# Patient Record
Sex: FEMALE | Race: WHITE | URBAN - METROPOLITAN AREA
[De-identification: names, ages, dates, MRNs, and addresses within clinical notes are randomized per-mention and may not be internally consistent; named-entity substitution may affect disease eponyms.]

---

## 2017-10-16 ENCOUNTER — APPOINTMENT (RX ONLY)
Dept: URBAN - METROPOLITAN AREA CLINIC 39 | Facility: CLINIC | Age: 47
Setting detail: DERMATOLOGY
End: 2017-10-16

## 2017-10-16 DIAGNOSIS — L92.3 FOREIGN BODY GRANULOMA OF THE SKIN AND SUBCUTANEOUS TISSUE: ICD-10-CM

## 2017-10-16 DIAGNOSIS — Z79.899 OTHER LONG TERM (CURRENT) DRUG THERAPY: ICD-10-CM

## 2017-10-16 PROBLEM — E03.9 HYPOTHYROIDISM, UNSPECIFIED: Status: ACTIVE | Noted: 2017-10-16

## 2017-10-16 PROBLEM — F41.9 ANXIETY DISORDER, UNSPECIFIED: Status: ACTIVE | Noted: 2017-10-16

## 2017-10-16 PROCEDURE — ? PRESCRIPTION

## 2017-10-16 PROCEDURE — ? TREATMENT REGIMEN

## 2017-10-16 PROCEDURE — 99202 OFFICE O/P NEW SF 15 MIN: CPT

## 2017-10-16 PROCEDURE — ? COUNSELING

## 2017-10-16 RX ORDER — PREDNISONE 10 MG/1
TABLET ORAL QD
Qty: 32 | Refills: 0 | Status: ERX | COMMUNITY
Start: 2017-10-16

## 2017-10-16 RX ORDER — CEFDINIR 300 MG/1
CAPSULE ORAL BID
Qty: 20 | Refills: 0 | Status: ERX | COMMUNITY
Start: 2017-10-16

## 2017-10-16 RX ORDER — FLUCONAZOLE 150 MG/1
TABLET ORAL QWEEK
Qty: 1 | Refills: 3 | Status: ERX | COMMUNITY
Start: 2017-10-16

## 2017-10-16 RX ADMIN — CEFDINIR -: 300 CAPSULE ORAL at 00:00

## 2017-10-16 RX ADMIN — FLUCONAZOLE: 150 TABLET ORAL at 00:00

## 2017-10-16 RX ADMIN — PREDNISONE -: 10 TABLET ORAL at 00:00

## 2017-10-16 ASSESSMENT — LOCATION DETAILED DESCRIPTION DERM
LOCATION DETAILED: RIGHT INFERIOR VERMILION LIP
LOCATION DETAILED: RIGHT SUPERIOR VERMILION LIP
LOCATION DETAILED: LEFT SUPERIOR VERMILION LIP
LOCATION DETAILED: LEFT INFERIOR VERMILION LIP

## 2017-10-16 ASSESSMENT — LOCATION SIMPLE DESCRIPTION DERM
LOCATION SIMPLE: RIGHT LIP
LOCATION SIMPLE: LEFT LIP

## 2017-10-16 ASSESSMENT — LOCATION ZONE DERM: LOCATION ZONE: LIP

## 2017-10-16 NOTE — PROCEDURE: TREATMENT REGIMEN
Detail Level: Simple
Plan: OBSERVATION PHOTO'S TAKEN \\nPatient taken to [facility] to set up an appointment with Sully CRUZ so pre-steroid eval can be compared to post. Discussed hyaluronidase. Deferring in an effort to regain desired outcome as she enjoyed prior to 2 weeks ago. I ([facility]) was made aware or this case last week and spoke to the [employer] , Candy Pettit. Several articles were emailed that I have reviewed discussing reactions to fillers in general including HA's but not specific for Vollure. This is considered late (14d - 1yr onset). Granulomatous vs Biofilm (bacterial and less likely) discussed. Biopsy discussed but not strongly recommended pending treatment trial. Revisit if recitrant. \\n
Initiate Treatment: prednisone 10 mg tablet PO Frequency: qd Sig: Take 4 PO QD x 3d, 3/d x 3d, 2/d x 3d, 1/d x 3d, 1/2 /d x 4d (patient given instructions written by Dr. Alexandre on how to take dosage)\\nDifluchan 1 tab q week\\nCefdinir 300 mg one capsule bid
01-Sep-2017 17:57

## 2017-10-16 NOTE — HPI: SKIN IRRITATION
Additional History: Previous injection of Juvéderm Vollure in [place] office by Melanie. Patient called one week ago complaining of firm bumpy, swollen lips. She spoke to Amy in [facility] 10/11/2017. Amy recommended patient to follow up with Dr. Alexandre.

## 2017-10-30 ENCOUNTER — APPOINTMENT (RX ONLY)
Dept: URBAN - METROPOLITAN AREA CLINIC 39 | Facility: CLINIC | Age: 47
Setting detail: DERMATOLOGY
End: 2017-10-30

## 2017-10-30 DIAGNOSIS — L92.3 FOREIGN BODY GRANULOMA OF THE SKIN AND SUBCUTANEOUS TISSUE: ICD-10-CM

## 2017-10-30 DIAGNOSIS — Z79.899 OTHER LONG TERM (CURRENT) DRUG THERAPY: ICD-10-CM

## 2017-10-30 PROCEDURE — ? PRESCRIPTION

## 2017-10-30 PROCEDURE — ? TREATMENT REGIMEN

## 2017-10-30 PROCEDURE — 99213 OFFICE O/P EST LOW 20 MIN: CPT

## 2017-10-30 PROCEDURE — ? COUNSELING

## 2017-10-30 RX ORDER — PREDNISONE 10 MG/1
TABLET ORAL QD
Qty: 30 | Refills: 0 | Status: ERX

## 2017-10-30 ASSESSMENT — LOCATION DETAILED DESCRIPTION DERM
LOCATION DETAILED: LEFT INFERIOR VERMILION LIP
LOCATION DETAILED: LEFT SUPERIOR VERMILION LIP
LOCATION DETAILED: RIGHT INFERIOR VERMILION LIP
LOCATION DETAILED: RIGHT SUPERIOR VERMILION LIP

## 2017-10-30 ASSESSMENT — LOCATION SIMPLE DESCRIPTION DERM
LOCATION SIMPLE: RIGHT LIP
LOCATION SIMPLE: LEFT LIP

## 2017-10-30 ASSESSMENT — LOCATION ZONE DERM: LOCATION ZONE: LIP

## 2017-10-30 NOTE — PROCEDURE: TREATMENT REGIMEN
Detail Level: Simple
Plan: The appearance is improved but induration is still significant. Increase prednisone back to:  2,2,2,2,2,1,1,1,1,1,1,1/2,1,1/2,1,1/2,1,0,1,0,1,0...until modified otherwise. I would prefer that she undergo an evaluation from [place] (scheduled for next week) prior to deciding which/order of next options:\\n\\n1. Biopsy\\n2. hyaluronidase\\n3. Intralesional kenalog\\n

## 2017-11-02 ENCOUNTER — APPOINTMENT (RX ONLY)
Dept: URBAN - METROPOLITAN AREA CLINIC 58 | Facility: CLINIC | Age: 47
Setting detail: DERMATOLOGY
End: 2017-11-02

## 2017-11-02 DIAGNOSIS — Z41.9 ENCOUNTER FOR PROCEDURE FOR PURPOSES OTHER THAN REMEDYING HEALTH STATE, UNSPECIFIED: ICD-10-CM

## 2017-11-02 PROCEDURE — ? ADDITIONAL NOTES

## 2017-11-02 PROCEDURE — ? HYALURONIDASE INJECTION

## 2017-11-02 ASSESSMENT — LOCATION ZONE DERM: LOCATION ZONE: LIP

## 2017-11-02 ASSESSMENT — LOCATION SIMPLE DESCRIPTION DERM
LOCATION SIMPLE: RIGHT LIP
LOCATION SIMPLE: LEFT LIP

## 2017-11-02 ASSESSMENT — LOCATION DETAILED DESCRIPTION DERM
LOCATION DETAILED: LEFT INFERIOR VERMILION LIP
LOCATION DETAILED: RIGHT SUPERIOR VERMILION LIP
LOCATION DETAILED: LEFT SUPERIOR VERMILION LIP
LOCATION DETAILED: RIGHT INFERIOR VERMILION LIP

## 2017-11-02 NOTE — PROCEDURE: HYALURONIDASE INJECTION
Lot # (Optional): [id number]
Filler Previously Used (Optional): vollure
Total Volume (Ccs): 0.2
Expiration Date (Optional): 03/2019
Consent: The risks of contour defects and dimpling of the skin were reviewed with the patient prior to the injection.
Detail Level: Detailed
Hyaluronidase Preparation: hyaluronidase

## 2017-11-02 NOTE — PROCEDURE: ADDITIONAL NOTES
Additional Notes: Pt presented 3 months follow up post Vollure injection by Laura Hernandez on 7/19/17. 25 \\nDr Hernan saw patient in the OP location gave RX for Prednisone and Abx. Pt today with hard nodules in upper and lower vermillion body. Suggested Hylenex to dissolve.  Pt agreed.  Will follow up with pt in 10 days to assess.  Gave pt # to call for emergency or questions.  25units to right Verm body upper /25 units left Verm body upper\\n15 units to lower right Verm body / 15 units left lower Verm body \\nCold packs given. RTC PRN \\nG. Stephanie GÓMEZ, CANS

## 2017-11-14 ENCOUNTER — APPOINTMENT (RX ONLY)
Dept: URBAN - METROPOLITAN AREA CLINIC 58 | Facility: CLINIC | Age: 47
Setting detail: DERMATOLOGY
End: 2017-11-14

## 2017-11-14 DIAGNOSIS — Z41.9 ENCOUNTER FOR PROCEDURE FOR PURPOSES OTHER THAN REMEDYING HEALTH STATE, UNSPECIFIED: ICD-10-CM

## 2017-11-14 PROCEDURE — ? HYALURONIDASE INJECTION

## 2017-11-14 PROCEDURE — ? ADDITIONAL NOTES

## 2017-11-14 ASSESSMENT — LOCATION ZONE DERM: LOCATION ZONE: LIP

## 2017-11-14 ASSESSMENT — LOCATION SIMPLE DESCRIPTION DERM
LOCATION SIMPLE: LEFT LIP
LOCATION SIMPLE: RIGHT LIP

## 2017-11-14 NOTE — PROCEDURE: HYALURONIDASE INJECTION
Detail Level: Detailed
Expiration Date (Optional): 12/2019
Consent: The risks of contour defects and dimpling of the skin were reviewed with the patient prior to the injection.
Total Volume (Ccs): 0.2
Lot # (Optional): [id number]
Filler Previously Used (Optional): vollure
Hyaluronidase Preparation: hyaluronidase

## 2017-11-14 NOTE — PROCEDURE: ADDITIONAL NOTES
Additional Notes: Skin tested right forearm explained POC and continue treatments to dissolve.  Encourage pt to call PRN \\n50 units upper lip/ 30 units lower lip\\n25 upper right lip/ 15 lower right lip\\n25 upper left lip/ 15 lower right lip

## 2017-11-30 ENCOUNTER — APPOINTMENT (RX ONLY)
Dept: URBAN - METROPOLITAN AREA CLINIC 58 | Facility: CLINIC | Age: 47
Setting detail: DERMATOLOGY
End: 2017-11-30

## 2017-11-30 DIAGNOSIS — Z41.9 ENCOUNTER FOR PROCEDURE FOR PURPOSES OTHER THAN REMEDYING HEALTH STATE, UNSPECIFIED: ICD-10-CM

## 2017-11-30 PROCEDURE — ? ADDITIONAL NOTES

## 2017-11-30 PROCEDURE — ? HYALURONIDASE INJECTION

## 2017-11-30 ASSESSMENT — LOCATION SIMPLE DESCRIPTION DERM
LOCATION SIMPLE: LEFT LIP
LOCATION SIMPLE: RIGHT LIP

## 2017-11-30 ASSESSMENT — LOCATION ZONE DERM: LOCATION ZONE: LIP

## 2017-11-30 ASSESSMENT — LOCATION DETAILED DESCRIPTION DERM
LOCATION DETAILED: LEFT INFERIOR VERMILION LIP
LOCATION DETAILED: LEFT SUPERIOR VERMILION LIP
LOCATION DETAILED: RIGHT SUPERIOR VERMILION LIP
LOCATION DETAILED: RIGHT INFERIOR VERMILION LIP

## 2017-11-30 NOTE — PROCEDURE: HYALURONIDASE INJECTION
Detail Level: Detailed
Filler Previously Used (Optional): vollure
Lot # (Optional): [id number]
Consent: The risks of contour defects and dimpling of the skin were reviewed with the patient prior to the injection.
Total Volume (Ccs): 0.2
Expiration Date (Optional): 12/2018
Hyaluronidase Preparation: hyaluronidase

## 2017-12-14 ENCOUNTER — APPOINTMENT (RX ONLY)
Dept: URBAN - METROPOLITAN AREA CLINIC 58 | Facility: CLINIC | Age: 47
Setting detail: DERMATOLOGY
End: 2017-12-14

## 2017-12-14 DIAGNOSIS — Z41.9 ENCOUNTER FOR PROCEDURE FOR PURPOSES OTHER THAN REMEDYING HEALTH STATE, UNSPECIFIED: ICD-10-CM

## 2017-12-14 PROCEDURE — ? ADDITIONAL NOTES

## 2017-12-14 PROCEDURE — ? HYALURONIDASE INJECTION

## 2017-12-14 ASSESSMENT — LOCATION SIMPLE DESCRIPTION DERM
LOCATION SIMPLE: RIGHT LIP
LOCATION SIMPLE: RIGHT UPPER LIP
LOCATION SIMPLE: LEFT LIP

## 2017-12-14 ASSESSMENT — LOCATION DETAILED DESCRIPTION DERM
LOCATION DETAILED: LEFT INFERIOR VERMILION LIP
LOCATION DETAILED: RIGHT SUPERIOR VERMILION BORDER
LOCATION DETAILED: RIGHT SUPERIOR VERMILION LIP
LOCATION DETAILED: RIGHT INFERIOR VERMILION LIP
LOCATION DETAILED: LEFT SUPERIOR VERMILION LIP

## 2017-12-14 ASSESSMENT — LOCATION ZONE DERM: LOCATION ZONE: LIP

## 2017-12-14 NOTE — PROCEDURE: ADDITIONAL NOTES
Additional Notes: 35 units upper right  lip 35 units uper and medial lip\\n30 units lower right  15 units  lower left \\nTotal of 115 units given

## 2018-01-16 ENCOUNTER — APPOINTMENT (RX ONLY)
Dept: URBAN - METROPOLITAN AREA CLINIC 58 | Facility: CLINIC | Age: 48
Setting detail: DERMATOLOGY
End: 2018-01-16

## 2018-01-16 DIAGNOSIS — Z41.9 ENCOUNTER FOR PROCEDURE FOR PURPOSES OTHER THAN REMEDYING HEALTH STATE, UNSPECIFIED: ICD-10-CM

## 2018-01-16 PROCEDURE — ? HYALURONIDASE INJECTION

## 2018-01-16 PROCEDURE — ? ADDITIONAL NOTES

## 2018-01-16 ASSESSMENT — LOCATION DETAILED DESCRIPTION DERM: LOCATION DETAILED: RIGHT SUPERIOR VERMILION LIP

## 2018-01-16 ASSESSMENT — LOCATION ZONE DERM: LOCATION ZONE: LIP

## 2018-01-16 ASSESSMENT — LOCATION SIMPLE DESCRIPTION DERM: LOCATION SIMPLE: RIGHT LIP

## 2018-01-16 NOTE — PROCEDURE: ADDITIONAL NOTES
Additional Notes: Skin test left forearm : no reaction\\nRight upper medial  submucosa 6units\\nWill inject juvederm ultra plus xc

## 2018-01-16 NOTE — PROCEDURE: HYALURONIDASE INJECTION
Detail Level: Detailed
Total Volume (Ccs): 0.2
Consent: The risks of contour defects and dimpling of the skin were reviewed with the patient prior to the injection.
Expiration Date (Optional): 12/2019
Lot # (Optional): [id number]
Hyaluronidase Preparation: hyaluronidase
Filler Previously Used (Optional): vollure

## 2018-02-08 ENCOUNTER — APPOINTMENT (RX ONLY)
Dept: URBAN - METROPOLITAN AREA CLINIC 58 | Facility: CLINIC | Age: 48
Setting detail: DERMATOLOGY
End: 2018-02-08

## 2018-02-08 DIAGNOSIS — Z41.9 ENCOUNTER FOR PROCEDURE FOR PURPOSES OTHER THAN REMEDYING HEALTH STATE, UNSPECIFIED: ICD-10-CM

## 2018-02-08 PROCEDURE — ? JUVEDERM ULTRA PLUS XC INJECTION

## 2018-02-08 PROCEDURE — ? ADDITIONAL NOTES

## 2018-02-08 NOTE — PROCEDURE: ADDITIONAL NOTES
Additional Notes: Btmc 30g1”\\nUpper verm .4\\nLower verm.4\\nPerioral lines.2\\nSmall nodule on the right lower verm body

## 2018-02-08 NOTE — PROCEDURE: JUVEDERM ULTRA PLUS XC INJECTION
Include Cannula Information In Note?: Yes
Dorsal Hands Filler  Volume In Cc: 0
Post-Care Instructions: Patient instructed to apply ice to reduce swelling.
Additional Anesthesia Volume In Cc: 0.1
Consent: Written consent obtained. Risks include but not limited to bruising, beading, irregular texture, ulceration, infection, allergic reaction, scar formation, incomplete augmentation, temporary nature, procedural pain.
Topical Anesthesia?: BLT cream (benzocaine 20%, lidocaine 6%, tetracaine 4%)
Lot #: [id number]
Additional Area 3 Location: glabella
Additional Anesthesia Type: 0.1% lidocaine, 0.03% Marcaine, 1:1,200,000 epinephrine, 51 mcg clindamycin/ml
Anesthesia Volume In Cc: 0.5
Additional Area 2 Location: perioral lines
Anesthesia Type: 1% lidocaine with epinephrine
Additional Area 4 Location: forehead brow lines
Procedural Text: The filler was administered to the treatment areas noted above.
Detail Level: Detailed
Map Statment: See Attach Map for Complete Details
Additional Area 5 Location: smile lines
Include Cannula Size?: 30G
Additional Area 1 Location: oral comms
Include Cannula Brand?: DermaSculpt
Include Cannula Length?: 1 inch
Filler: Juvederm Ultra Plus XC
Expiration Date (Month Year): 2018/10/02

## 2018-02-27 ENCOUNTER — APPOINTMENT (RX ONLY)
Dept: URBAN - METROPOLITAN AREA CLINIC 58 | Facility: CLINIC | Age: 48
Setting detail: DERMATOLOGY
End: 2018-02-27

## 2018-02-27 DIAGNOSIS — Z41.9 ENCOUNTER FOR PROCEDURE FOR PURPOSES OTHER THAN REMEDYING HEALTH STATE, UNSPECIFIED: ICD-10-CM

## 2018-02-27 PROCEDURE — ? BOTOX

## 2018-02-27 PROCEDURE — ? ADDITIONAL NOTES

## 2018-02-27 ASSESSMENT — LOCATION DETAILED DESCRIPTION DERM
LOCATION DETAILED: RIGHT SUPERIOR CENTRAL MALAR CHEEK
LOCATION DETAILED: GLABELLA
LOCATION DETAILED: LEFT INFERIOR TEMPLE
LOCATION DETAILED: LEFT SUPERIOR LATERAL FOREHEAD
LOCATION DETAILED: LEFT SUPERIOR FOREHEAD
LOCATION DETAILED: RIGHT CENTRAL EYEBROW
LOCATION DETAILED: RIGHT INFERIOR TEMPLE
LOCATION DETAILED: LEFT MEDIAL EYEBROW
LOCATION DETAILED: LEFT SUPERIOR LATERAL MALAR CHEEK
LOCATION DETAILED: RIGHT SUPERIOR FOREHEAD
LOCATION DETAILED: RIGHT SUPERIOR MEDIAL FOREHEAD
LOCATION DETAILED: RIGHT CENTRAL FRONTAL SCALP
LOCATION DETAILED: RIGHT MID TEMPLE
LOCATION DETAILED: LEFT MID TEMPLE
LOCATION DETAILED: LEFT CENTRAL EYEBROW
LOCATION DETAILED: LEFT SUPERIOR MEDIAL FOREHEAD
LOCATION DETAILED: RIGHT SUPERIOR LATERAL FOREHEAD
LOCATION DETAILED: RIGHT MEDIAL EYEBROW
LOCATION DETAILED: LEFT SUPERIOR CENTRAL MALAR CHEEK

## 2018-02-27 ASSESSMENT — LOCATION SIMPLE DESCRIPTION DERM
LOCATION SIMPLE: GLABELLA
LOCATION SIMPLE: LEFT TEMPLE
LOCATION SIMPLE: LEFT CHEEK
LOCATION SIMPLE: LEFT FOREHEAD
LOCATION SIMPLE: RIGHT FOREHEAD
LOCATION SIMPLE: RIGHT SCALP
LOCATION SIMPLE: RIGHT EYEBROW
LOCATION SIMPLE: LEFT EYEBROW
LOCATION SIMPLE: RIGHT TEMPLE
LOCATION SIMPLE: RIGHT CHEEK

## 2018-02-27 ASSESSMENT — LOCATION ZONE DERM
LOCATION ZONE: FACE
LOCATION ZONE: SCALP

## 2018-02-27 NOTE — PROCEDURE: BOTOX
Additional Area 4 Units: 0
Detail Level: Detailed
Periorbital Skin Units: 18
Additional Area 5 Location: TMJ
Expiration Date (Month Year): 09/2020
Glabellar Complex Units: 14
Additional Area 3 Location: left lateral  brow
Lot #: [id number]
Additional Area 1 Location: anahi
Post-Care Instructions: Patient instructed to not lie down for 4 hours and limit physical activity for 24 hours. Patient instructed not to travel by airplane for 48 hours.
Dilution (U/0.1 Cc): 2
Additional Area 6 Location: magui ku
Additional Area 2 Location: chin
Consent: Written consent obtained. Risks include but not limited to lid/brow ptosis, bruising, swelling, diplopia, temporary effect, incomplete chemical denervation.
Additional Area 4 Location: lip
Forehead Units: 8

## 2018-05-03 ENCOUNTER — APPOINTMENT (RX ONLY)
Dept: URBAN - METROPOLITAN AREA CLINIC 58 | Facility: CLINIC | Age: 48
Setting detail: DERMATOLOGY
End: 2018-05-03

## 2018-05-03 DIAGNOSIS — Z41.9 ENCOUNTER FOR PROCEDURE FOR PURPOSES OTHER THAN REMEDYING HEALTH STATE, UNSPECIFIED: ICD-10-CM

## 2018-05-03 PROCEDURE — ? COSMETIC FOLLOW-UP
